# Patient Record
Sex: FEMALE | Race: WHITE | NOT HISPANIC OR LATINO | Employment: OTHER | URBAN - METROPOLITAN AREA
[De-identification: names, ages, dates, MRNs, and addresses within clinical notes are randomized per-mention and may not be internally consistent; named-entity substitution may affect disease eponyms.]

---

## 2017-01-20 ENCOUNTER — ALLSCRIPTS OFFICE VISIT (OUTPATIENT)
Dept: OTHER | Facility: OTHER | Age: 52
End: 2017-01-20

## 2017-02-12 ENCOUNTER — ALLSCRIPTS OFFICE VISIT (OUTPATIENT)
Dept: OTHER | Facility: OTHER | Age: 52
End: 2017-02-12

## 2017-03-12 ENCOUNTER — ALLSCRIPTS OFFICE VISIT (OUTPATIENT)
Dept: OTHER | Facility: OTHER | Age: 52
End: 2017-03-12

## 2017-03-12 ENCOUNTER — HOSPITAL ENCOUNTER (OUTPATIENT)
Dept: RADIOLOGY | Facility: CLINIC | Age: 52
Discharge: HOME | End: 2017-03-12
Payer: COMMERCIAL

## 2017-03-12 DIAGNOSIS — S61.451A OPEN BITE OF RIGHT HAND: ICD-10-CM

## 2017-03-12 PROCEDURE — 73120 X-RAY EXAM OF HAND: CPT

## 2017-07-13 ENCOUNTER — ALLSCRIPTS OFFICE VISIT (OUTPATIENT)
Dept: OTHER | Facility: OTHER | Age: 52
End: 2017-07-13

## 2018-01-11 NOTE — PROGRESS NOTES
Assessment   1  Dog bite of hand, right, initial encounter (882 0,E906 0) (A11 633U,X62  0XXA)  2  Foreign body of hand, right (914 6) (D89 951G)    Plan  Dog bite of hand, right, initial encounter    · Start: Ciprofloxacin HCl - 500 MG Oral Tablet; take 1 tab po BID x 7 days   · Start: Clindamycin HCl - 300 MG Oral Capsule; take 1 tab po q6 hrs x 7 days   · XR HAND 2 VIEW RIGHT; Status:Complete;   Done: 80BEH0089 11:32AM   · Administer: Tdap (Boostrix); INJECT 0 5  ML Intramuscular Give 0 5 ML per provider's  instructions  Advised pt of symptoms to watch out for if reaction; To Be Done:  [id number]  Foreign body of hand, right    · Hand Surgery Referral Other Physician Referral  Consult Only: the expectation is that the  referring provider will communicate back to the patient on treatment options  Evaluation  and Treatment: the expectation is that the referred to provider will communicate back  to the patient on treatment options  Status: Hold For - Scheduling  Requested  for: [id number]  YOU are Referring to a non- Preferred Provider : Established Patient  (MU) Care Summary provided  : Yes    Discussion/Summary  Discussion Summary:   Dog bite of right hand- xray shows no acute fracture however there is a small foreign body between the 4th and 5th metacarpals  Bite site is between the 2nd and 3rd metacarpals therefore FB is not directly at wound site  Patient states shes never had an xray of this hand prior to this, however denies any history or possibility of foreign body in that hand  I have discussed the case with Dr Hawk Aviles at [facility] [place] [facility]  She has recommended that patient be referred to a hand surgeon for further evaluation and removal of FB if needed  Does not need to be seen today but should be followed up soon  Patient is a part of the [facility] system therefore I have referred her to [facility] at [address] [place] [address], [phone]   I have provided patient with all of this information and instructed to call first thing tomorrow morning to arrange follow up  I have also explained to her that she needs to see her PCP tomorrow for a wound check  We have soaked the wound in betadine, cleaned and irrigated w/ saline, and applied Neosporin w/ a dressing  Patient is allergic to PCN therefore I have prescribed Clindamycin and Ciprofloxacin as prophylaxis  (Script for the Doxycycline has been cancelled)  I have recommended for the antibiotics to be called into a 24 hour pharmacy so patient can begin the course today however patient has declined stating she does not want to drive to the 24 hour pharmacy and has instructed for the scripts to be called into [facility]  1  Tetanus vaccine administered  Patient is to follow up w/ PCP in 24 hours for re-check  May take Tylenol as needed for pain  Instructed to watch for signs of infection- redness, swelling, drainage of pus, increased tenderness, and fever  Patient verbalizes understanding and agrees w/ treatment plan  [facility] animal bite report has been completed and faxed  Medication Side Effects Reviewed: Possible side effects of new medications were reviewed with the patient/guardian today  Understands and agrees with treatment plan: The treatment plan was reviewed with the patient/guardian  The patient/guardian understands and agrees with the treatment plan   Counseling Documentation With Imm: The patient was counseled regarding instructions for management, patient and family education, importance of compliance with treatment  Follow Up Instructions: Follow Up with your Primary Care Provider in 1 days  If your symptoms worsen, go to the nearest [place] [address] Emergency Department  1 Amended By: Becca Jimenez; Mar 12 2017 6:50 PM EST    Chief Complaint  Chief Complaint Free Text Note Form: dog bite to right hand ( her own) last night at 11pm  pt reports that dog has all its shots        History of Present Illness  HPI: 46 yo female presents for dog bite to right dorsal hand last night  States the dog was in her lap and she accidentally pushed the dog and the dog got frightened and bit her  States the dog is healthy and up to date with all its vaccines  Patient does not recall when her last tetanus vaccine was  States she washed the wound and covered it  Wound is now mostly closed, but patient states wound was not very deep, no bone involvement, and only involved the skin  Is able to move hand in full ROM w/o any problems  Denies any numbness/tingling  Has some swelling at the site of the bite but otherwise denies any redness or drainage of pus  No fever/chills  Review of Systems  Focused-Female:   Constitutional: No fever, no chills, feels well, no tiredness, no recent weight gain or loss  Musculoskeletal: as noted in HPI  Integumentary: as noted in HPI  Neurological: no complaints of headache, no confusion, no numbness or tingling, no dizziness or fainting  Active Problems   1  Acute URI (465 9) (J06 9)  2  Laceration of finger, initial encounter (883 0) (G89 033O)    Past Medical History   1  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)  2  History of depression (V11 8) (Z86 59)  3  History of hypertension (V12 59) (Z86 79)  Active Problems And Past Medical History Reviewed: The active problems and past medical history were reviewed and updated today  Family History  Father   1  Family history of cardiac disorder (V17 49) (Z82 49)  Family History Reviewed: The family history was reviewed and updated today  Social History    · Current every day smoker (305 1) (F39 018)   · No illicit drug use   · Social alcohol use (Z78 9)  Social History Reviewed: The social history was reviewed and updated today  Surgical History  Surgical History Reviewed: The surgical history was reviewed and updated today  Current Meds  1  Bentyl CAPS; 2 tabs daily;    Therapy: (Lana Saenz) to Recorded  2  Benzonatate 200 MG Oral Capsule; TAKE 1 CAPSULE 3 TIMES DAILY AS NEEDED FOR   COUGH  NOT TO BE TAKEN PRIOR TO DRIVING OR OPERATING MACHINERY AS IT   CAN CAUSE DROWSINESS; Therapy: [id number] to (Evaluate:27Jan2017)  Requested for: 20Jan2017; Last   Rx:20Jan2017 Ordered  3  Citalopram Hydrobromide 40 MG Oral Tablet; Therapy: ([facility]) to Recorded  4  Coreg 25 MG Oral Tablet; TAKE 1 TABLET TWICE DAILY WITH MEALS; Therapy: ([facility]) to Recorded  5  HydroCHLOROthiazide 12 5 MG Oral Tablet; TAKE 1 TABLET DAILY; Therapy: ([facility]) to Recorded  6  NexIUM 40 MG Oral Capsule Delayed Release; TAKE 1 CAPSULE ONCE DAILY; Therapy: ([facility]) to Recorded  7  Quinapril HCl - 20 MG Oral Tablet; TAKE 1 TABLET DAILY; Therapy: (Recorded:20Jan2017) to Recorded  Medication List Reviewed: The medication list was reviewed and updated today  Allergies   1  Penicillins    Vitals  Signs   Recorded: 96RKQ7625 11:17AM   Temperature: 98 4 F  Heart Rate: 81  Respiration: 16  Systolic: 837  Diastolic: 96  Height: 5 ft 3 in  Weight: 168 lb   BMI Calculated: 29 76  BSA Calculated: 1 8  O2 Saturation: 99    Physical Exam    Constitutional   General appearance: No acute distress, well appearing and well nourished  Musculoskeletal   Digits and nails: Normal without clubbing or cyanosis  Inspection/palpation of joints, bones, and muscles: Normal   hand joints w/ full ROM, no difficulty w/ movement  there does not appear to be any bone or tendon involvement  Skin   Skin and subcutaneous tissue: Abnormal   ~1 inch curved bite wound between the 2nd and 3rd metacarpal over the dorsal surface of the right hand  wound has mostly closed at this point therefore internal examination is not possible  wound area is mildly swollen and tender to palpation  no erythema, warmth, or drainage noted  sensations intact  skin is warm with good capillary refill   all joints and digits w/ full ROM, hand  strength intact  Neurologic   Sensation: No sensory loss  Psychiatric   Orientation to person, place, and time: Normal     Mood and affect: Normal        Results/Data  XR HAND 2 VIEW RIGHT 12Mar2017 11:32AM Cady Kelley Order Number: [id number]   Performing Comments: rule out fracture    rule out foreign body     Test Name Result Flag Reference   XR HAND 2 VW RIGHT (Report)     RIGHT HAND     INDICATION: Dog bite  Rule out fracture and foreign body  COMPARISON: None     VIEWS: 2     IMAGES: 2     For the purposes of institution wide universal language the following terms will apply: (thumb=1st digit/finger, index finger=2nd digit/finger, long finger=3rd digit/finger, ring=4th digit/finger and small finger=5th digit/finger)     FINDINGS:     There is no acute fracture or dislocation  No degenerative changes  No lytic or blastic lesions are seen  Small foreign body projected between the 4th and 5th metacarpals  IMPRESSION:     No acute osseous abnormality  Small foreign body projected between the 4th and 5th metacarpals         ##sigslh##sigslh       Workstation performed: [id number]     Signed by:   Kimberly Bland MD   3/12/17       Signatures   Electronically signed by : MICHELE Fisher ; Mar 12 2017  6:42PM EST                       (Author)    Electronically signed by : MICHELE Fisher ; Mar 12 2017  6:50PM EST                       (Author)

## 2018-01-13 VITALS
WEIGHT: 168.4 LBS | RESPIRATION RATE: 20 BRPM | BODY MASS INDEX: 29.84 KG/M2 | SYSTOLIC BLOOD PRESSURE: 148 MMHG | TEMPERATURE: 97.5 F | OXYGEN SATURATION: 98 % | DIASTOLIC BLOOD PRESSURE: 92 MMHG | HEIGHT: 63 IN | HEART RATE: 68 BPM

## 2018-01-14 VITALS
OXYGEN SATURATION: 98 % | HEART RATE: 73 BPM | RESPIRATION RATE: 18 BRPM | TEMPERATURE: 98.3 F | DIASTOLIC BLOOD PRESSURE: 98 MMHG | SYSTOLIC BLOOD PRESSURE: 144 MMHG

## 2018-01-14 VITALS
BODY MASS INDEX: 29.77 KG/M2 | RESPIRATION RATE: 16 BRPM | HEART RATE: 81 BPM | WEIGHT: 168 LBS | SYSTOLIC BLOOD PRESSURE: 130 MMHG | HEIGHT: 63 IN | TEMPERATURE: 98.4 F | OXYGEN SATURATION: 99 % | DIASTOLIC BLOOD PRESSURE: 96 MMHG

## 2018-01-15 VITALS
RESPIRATION RATE: 18 BRPM | DIASTOLIC BLOOD PRESSURE: 84 MMHG | OXYGEN SATURATION: 97 % | TEMPERATURE: 96.4 F | HEIGHT: 63 IN | BODY MASS INDEX: 29.77 KG/M2 | HEART RATE: 70 BPM | SYSTOLIC BLOOD PRESSURE: 134 MMHG | WEIGHT: 168 LBS